# Patient Record
Sex: MALE
[De-identification: names, ages, dates, MRNs, and addresses within clinical notes are randomized per-mention and may not be internally consistent; named-entity substitution may affect disease eponyms.]

---

## 2017-10-27 ENCOUNTER — RECORDS - HEALTHEAST (OUTPATIENT)
Dept: ADMINISTRATIVE | Facility: OTHER | Age: 57
End: 2017-10-27

## 2017-11-07 ENCOUNTER — OFFICE VISIT - HEALTHEAST (OUTPATIENT)
Dept: FAMILY MEDICINE | Facility: CLINIC | Age: 57
End: 2017-11-07

## 2017-11-07 DIAGNOSIS — M79.18 RHOMBOID MUSCLE PAIN: ICD-10-CM

## 2017-11-07 ASSESSMENT — MIFFLIN-ST. JEOR: SCORE: 1510.72

## 2018-03-04 ENCOUNTER — OFFICE VISIT - HEALTHEAST (OUTPATIENT)
Dept: FAMILY MEDICINE | Facility: CLINIC | Age: 58
End: 2018-03-04

## 2018-03-04 DIAGNOSIS — J10.1 INFLUENZA B: ICD-10-CM

## 2018-03-04 LAB
FLUAV AG SPEC QL IA: ABNORMAL
FLUBV AG SPEC QL IA: ABNORMAL

## 2018-03-04 RX ORDER — ACETAMINOPHEN 500 MG
1000 TABLET ORAL EVERY 6 HOURS PRN
Qty: 45 TABLET | Refills: 0 | Status: SHIPPED | OUTPATIENT
Start: 2018-03-04

## 2018-06-11 ENCOUNTER — OFFICE VISIT - HEALTHEAST (OUTPATIENT)
Dept: FAMILY MEDICINE | Facility: CLINIC | Age: 58
End: 2018-06-11

## 2018-06-11 DIAGNOSIS — M54.50 ACUTE LEFT-SIDED LOW BACK PAIN WITHOUT SCIATICA: ICD-10-CM

## 2018-06-11 ASSESSMENT — MIFFLIN-ST. JEOR: SCORE: 1515.26

## 2021-05-31 VITALS — HEIGHT: 65 IN | BODY MASS INDEX: 28.53 KG/M2 | WEIGHT: 171.25 LBS

## 2021-06-01 VITALS — BODY MASS INDEX: 28.7 KG/M2 | HEIGHT: 65 IN | WEIGHT: 172.25 LBS

## 2021-06-01 VITALS — WEIGHT: 175 LBS | BODY MASS INDEX: 29.57 KG/M2

## 2021-06-14 NOTE — PROGRESS NOTES
Assessment: /    Plan:    1. Rhomboid muscle pain         He will use heat and Tylenol as needed.  Anticipate gradual improvement.  Recheck if not resolving.  Patient was seen with professional , Nini DELEON      Subjective:    HPI:  Jose Matute is a 57-year-old male presenting with pain behind the right shoulder.  This began 5 days ago after sawing at work.  Pain is located near the right scapula.  Pain occurs when he is moving his arm.  He has not taken medication for this.  It is not worsening.    Social Hx: He works as a  for an apartment.    Review of Systems: No fever or cough.  No weakness or tingling of the arms.      Current Outpatient Prescriptions   Medication Sig Dispense Refill     glucosamine sulfate 500 mg cap Take 500 mg by mouth daily.       No current facility-administered medications for this visit.          Objective:    Vitals:    11/07/17 1553   BP: 112/70   Pulse: 90   Resp: 19   Temp: 98.6  F (37  C)   SpO2: 96%       Gen:  NAD, VSS  Lungs:  normal  Heart:  normal  Shoulders with full range of motion, normal strength and sensation  Back with tenderness of the right rhomboid muscle.        ADDITIONAL HISTORY SUMMARIZED (2): None.  DECISION TO OBTAIN EXTRA INFORMATION (1): None.   RADIOLOGY TESTS (1): None.  LABS (1): None.  MEDICINE TESTS (1): None.  INDEPENDENT REVIEW (2 each): None.     Total Data Points: 0

## 2021-06-16 NOTE — PROGRESS NOTES
[facility]     SUBJECTIVE: Jose Matute is a 58 y.o. male who presents today with body aches, cough, and congestion.     He said his symptoms started Friday with cough, body aches, fever, mild nausea, and nasal congestion.  No one else is sick around him.  He has tried some over-the-counter medicines without significant relief.     ROS:   -      PMH:   Patient Active Problem List    Diagnosis Date Noted     Vitamin D Deficiency      Coughing Up Blood (Hemoptysis)      Dermatitis      Joint Pain, Localized In The Knee      Chest Pain      OBJECTIVE:    Vitals: /72 (Patient Site: Left Arm, Patient Position: Sitting, Cuff Size: Adult Regular)  Pulse 97  Temp 99.2  F (37.3  C) (Oral)   Wt 175 lb (79.4 kg)  SpO2 98%  BMI 29.57 kg/m2   BMI= Body mass index is 29.57 kg/(m^2).    General: Sitting on exam table, NAD  HEENT: PERRL, MMM.  Normal Conjunctiva, normal TMs bilaterally, +clear nasal drainage, no pharyngeal erythema or tonsillar exudates.  Non-tender/non-enlarged ant cervical nodes.  Neck: Supple  CV: RRR, no m/r/g   Pulm: CTAB, no wheezing, rhonchi or crackles  Ext: Warm, no edema, no erythema  Neuro: Alert and interactive  Psych: Calm, normal affect    Recent Results (from the past 24 hour(s))   Influenza A/B Rapid Test   Result Value Ref Range    Influenza  A, Rapid Antigen No Influenza A antigen detected No Influenza A antigen detected    Influenza B, Rapid Antigen Influenza B antigen detected (!) No Influenza B antigen detected       ASSESSMENT AND PLAN:   Jose Matute is a 58 y.o. male who presents today with body aches, cough, congestion; in stable condition.     1. Influenza B  -Rapid testing positive in clinic today.  His symptoms started within the last 2 days so we opted to try Tamiflu to reduce duration of symptoms.  I advised Tylenol and ibuprofen for fevers and/or discomfort.  Return precautions discussed.  - acetaminophen (TYLENOL EXTRA STRENGTH) 500 MG tablet; Take 2 tablets  (1,000 mg total) by mouth every 6 (six) hours as needed for pain.  Dispense: 45 tablet; Refill: 0  - oseltamivir (TAMIFLU) 75 MG capsule; Take 1 capsule (75 mg total) by mouth 2 (two) times a day for 5 days.  Dispense: 10 capsule; Refill: 0    Options for treatment and/or follow-up care were reviewed with the patient and/or guardian. Jose Nguyenra and/or guardian was engaged and actively involved in the decision making process. He and/or guardian verbalized understanding of the options discussed and was satisfied with the final plan.    Follow up above with PCP in 3-5 days if not improving or sooner if develops new or worsening symptoms.    James Eddy, DO    This note was created with help of Dragon dictation system. Grammatical /typing errors are not intentional.

## 2023-10-30 NOTE — PROGRESS NOTES
Assessment: /    Plan:    1. Acute left-sided low back pain without sciatica         Avoid bending at the waist.  Recheck if any problem.      Subjective:    HPI:  Jose Matute is a 58-year-old male presenting with left low back pain.  This began 1 week ago when he bent to lift a lightweight box.  Pain worsened for a few days, and he scheduled an appointment.  He has been feeling better since then.  Taking Advil.      Review of Systems:  No fever, cough, dysuria, tingling or weakness of legs.      Current Outpatient Prescriptions   Medication Sig Dispense Refill     acetaminophen (TYLENOL EXTRA STRENGTH) 500 MG tablet Take 2 tablets (1,000 mg total) by mouth every 6 (six) hours as needed for pain. 45 tablet 0     glucosamine sulfate 500 mg cap Take 500 mg by mouth daily.       No current facility-administered medications for this visit.          Objective:    Vitals:    06/11/18 0846   BP: 114/68   Pulse: 76   Resp: 19   Temp: 97.9  F (36.6  C)   SpO2: 98%       Gen:  NAD, VSS  Lungs:  normal  Heart:  normal  Back with no CVA tenderness, no midline thoracic or lumbar spinal tenderness.  SLR normal.        ADDITIONAL HISTORY SUMMARIZED (2): None.  DECISION TO OBTAIN EXTRA INFORMATION (1): None.   RADIOLOGY TESTS (1): None.  LABS (1): None.  MEDICINE TESTS (1): None.  INDEPENDENT REVIEW (2 each): None.     Total Data Points: 0    
31-Oct-2023